# Patient Record
Sex: FEMALE | Race: WHITE
[De-identification: names, ages, dates, MRNs, and addresses within clinical notes are randomized per-mention and may not be internally consistent; named-entity substitution may affect disease eponyms.]

---

## 2017-04-23 ENCOUNTER — HOSPITAL ENCOUNTER (EMERGENCY)
Dept: HOSPITAL 62 - ER | Age: 47
Discharge: HOME | End: 2017-04-23
Payer: SELF-PAY

## 2017-04-23 VITALS — DIASTOLIC BLOOD PRESSURE: 80 MMHG | SYSTOLIC BLOOD PRESSURE: 128 MMHG

## 2017-04-23 DIAGNOSIS — M54.2: Primary | ICD-10-CM

## 2017-04-23 DIAGNOSIS — F17.200: ICD-10-CM

## 2017-04-23 DIAGNOSIS — M50.30: ICD-10-CM

## 2017-04-23 PROCEDURE — 99283 EMERGENCY DEPT VISIT LOW MDM: CPT

## 2017-04-23 PROCEDURE — 72050 X-RAY EXAM NECK SPINE 4/5VWS: CPT

## 2017-04-23 NOTE — ER DOCUMENT REPORT
ED Neck/Back Problem





- General


Chief Complaint: Neck Problem


Stated Complaint: NECK PAIN


Notes: 


45 yo female c/o pain to neck x 2-3 days.  hx/o degenerative disc disease.  no 

recent trauma, fever, headache.  


TRAVEL OUTSIDE OF THE [place] IN LAST 30 DAYS: No





- HPI


Patient complains to provider of: Pain


Onset: Yesterday


Onset: Gradual


Timing: Still present


Quality of pain: Achy, Dull


Associated symptoms: None


Exacerbated by: Movement of neck


Relieved by: Supine


Similar symptoms previously: Yes


Recently seen / treated by doctor: No





- Related Data


Allergies/Adverse Reactions: 


 





adhesive tape [Adhesive Tape] Allergy (Intermediate, Verified 17 16:07)


 


cefuroxime axetil [From Ceftin] Allergy (Mild, Verified 17 16:07)


 Nausea


clindamycin [Clindamycin] Allergy (Verified 17 16:07)


 











Past Medical History





- General


Information source: Patient





- Social History


Smoking Status: Current Every Day Smoker


Frequency of alcohol use: None


Drug Abuse: None


Lives with: Family


Family History: Reviewed & Not Pertinent


Patient has suicidal ideation: No


Patient has homicidal ideation: No





- Past Medical History


Cardiac Medical History: Reports: Hx Hypertension


   Denies: Hx Coronary Artery Disease, Hx Heart Attack


Pulmonary Medical History: Reports: Hx Asthma, Hx Bronchitis, Hx Pneumonia


   Denies: Hx COPD


Neurological Medical History: Denies: Hx Cerebrovascular Accident, Hx Seizures


Endocrine Medical History: Denies: Hx Diabetes Mellitus Type 1, Hx Diabetes 

Mellitus Type 2


Renal/ Medical History: Denies: Hx Peritoneal Dialysis


GI Medical History: Reports: Hx Gastroesophageal Reflux Disease.  Denies: Hx 

Irritable Bowel


Musculoskeltal Medical History: Reports Hx Arthritis


Psychiatric Medical History: Reports: Hx Anxiety, Hx Bipolar Disorder, Hx 

Depression


Past Surgical History: Reports: Hx  Section, Hx Orthopedic Surgery.  

Denies: Hx Hysterectomy, Hx Pacemaker





- Immunizations


Immunizations up to date: Yes


Hx Diphtheria, Pertussis, Tetanus Vaccination: Yes





Review of Systems





- Review of Systems


Constitutional: No symptoms reported


EENT: No symptoms reported


Cardiovascular: No symptoms reported


Respiratory: No symptoms reported


Gastrointestinal: No symptoms reported


Genitourinary: No symptoms reported


Female Genitourinary: No symptoms reported


Musculoskeletal: No symptoms reported


Skin: No symptoms reported


Hematologic/Lymphatic: No symptoms reported


Neurological/Psychological: No symptoms reported


-: Yes All other systems reviewed and negative





Physical Exam





- Vital signs


Vitals: 


 











Temp Pulse Resp BP Pulse Ox


 


 98.3 F   79   20   122/85   98 


 


 17 16:04  17 16:04  17 16:04  17 16:04  17 16:04











Interpretation: Normal





- General


General appearance: Appears well, Alert





- HEENT


Head: Normocephalic, Atraumatic


Eyes: Normal


Conjunctiva: Normal


Extraocular movements intact: Yes


Pupils: PERRL


Neck: Supple - + suboccipital tenderness, + certebral tenderness over C3-4 

area.  No: Meningismus





- Respiratory


Respiratory status: No respiratory distress


Chest status: Nontender


Breath sounds: Normal


Chest palpation: Normal





- Cardiovascular


Rhythm: Regular


Heart sounds: Normal auscultation


Murmur: No





- Abdominal


Inspection: Normal


Distension: No distension


Bowel sounds: Normal


Tenderness: Nontender


Organomegaly: No organomegaly





- Back


Back: Normal, Nontender





- Extremities


General upper extremity: Normal inspection, Nontender, Normal color, Normal ROM

, Normal temperature


General lower extremity: Normal inspection, Nontender, Normal color, Normal ROM

, Normal temperature, Normal weight bearing.  No: Paris's sign





- Neurological


Neuro grossly intact: Yes


Cognition: Normal


Orientation: AAOx4


[place] Coma Scale Eye Opening: Spontaneous


[place] Coma Scale Verbal: Oriented


[place] Coma Scale Motor: Obeys Commands


[place] Coma Scale Total: 15


Speech: Normal


Motor strength normal: LUE, RUE, LLE, RLE


Sensory: Normal





- Psychological


Associated symptoms: Normal affect, Normal mood





- Skin


Skin Temperature: Warm


Skin Moisture: Dry


Skin Color: Normal





Course





- Re-evaluation


Re-evalutation: 





17 17:10


cervical xray showing DDD lower cervical spine, consistant with H&P.  will DC 

with muscle relaxant and anti-inflammatory medications.  pt stable for discharge





- Vital Signs


Vital signs: 


 











Temp Pulse Resp BP Pulse Ox


 


 98.3 F   79   20   122/85   98 


 


 17 16:04  17 16:04  17 16:04  17 16:04  17 16:04














Discharge





- Discharge


Clinical Impression: 


 Neck pain





Degenerative disc disease


Qualifiers:


 Spinal region: unspecified cervical region Qualified Code(s): M50.30 - Other 

cervical disc degeneration, unspecified cervical region





Condition: Stable


Disposition: HOME, SELF-CARE


Instructions:  Muscle Relaxers ([facility]), Ibuprofen (General) ([facility]), Warm Packs ([facility]

)


Additional Instructions: 


Your xray is showing degenerative disc disease in the lower cervical spine


Take med as prescribed


Follow up with primary care/orthopedist if pain persists


Prescriptions: 


Ibuprofen [Motrin 800 Mg Tablet] 800 mg PO Q6H #20 tablet


Methocarbamol [Robaxin 500 Mg Tablet] 1,000 mg PO Q6 #30 tablet

## 2017-05-04 ENCOUNTER — HOSPITAL ENCOUNTER (OUTPATIENT)
Dept: HOSPITAL 62 - OD | Age: 47
End: 2017-05-04
Attending: ORTHOPAEDIC SURGERY
Payer: SELF-PAY

## 2017-05-04 DIAGNOSIS — Z01.810: Primary | ICD-10-CM

## 2017-05-04 DIAGNOSIS — Z01.812: ICD-10-CM

## 2017-05-04 DIAGNOSIS — Z01.818: ICD-10-CM

## 2017-05-04 LAB
ANION GAP SERPL CALC-SCNC: 14 MMOL/L (ref 5–19)
BASOPHILS # BLD AUTO: 0.1 10^3/UL (ref 0–0.2)
BASOPHILS NFR BLD AUTO: 0.6 % (ref 0–2)
BUN SERPL-MCNC: 14 MG/DL (ref 7–20)
CALCIUM: 9.5 MG/DL (ref 8.4–10.2)
CHLORIDE SERPL-SCNC: 103 MMOL/L (ref 98–107)
CO2 SERPL-SCNC: 23 MMOL/L (ref 22–30)
CREAT SERPL-MCNC: 0.73 MG/DL (ref 0.52–1.25)
EOSINOPHIL # BLD AUTO: 0.1 10^3/UL (ref 0–0.6)
EOSINOPHIL NFR BLD AUTO: 1.2 % (ref 0–6)
ERYTHROCYTE [DISTWIDTH] IN BLOOD BY AUTOMATED COUNT: 13.8 % (ref 11.5–14)
GLUCOSE SERPL-MCNC: 134 MG/DL (ref 75–110)
HCT VFR BLD CALC: 40.3 % (ref 36–47)
HGB BLD-MCNC: 13.4 G/DL (ref 12–15.5)
HGB HCT DIFFERENCE: -0.1
LYMPHOCYTES # BLD AUTO: 2.3 10^3/UL (ref 0.5–4.7)
LYMPHOCYTES NFR BLD AUTO: 23.5 % (ref 13–45)
MCH RBC QN AUTO: 29.4 PG (ref 27–33.4)
MCHC RBC AUTO-ENTMCNC: 33.2 G/DL (ref 32–36)
MCV RBC AUTO: 89 FL (ref 80–97)
MONOCYTES # BLD AUTO: 0.5 10^3/UL (ref 0.1–1.4)
MONOCYTES NFR BLD AUTO: 5.2 % (ref 3–13)
NEUTROPHILS # BLD AUTO: 6.7 10^3/UL (ref 1.7–8.2)
NEUTS SEG NFR BLD AUTO: 69.5 % (ref 42–78)
POTASSIUM SERPL-SCNC: 3.9 MMOL/L (ref 3.6–5)
RBC # BLD AUTO: 4.55 10^6/UL (ref 3.72–5.28)
SODIUM SERPL-SCNC: 140 MMOL/L (ref 137–145)
WBC # BLD AUTO: 9.6 10^3/UL (ref 4–10.5)

## 2017-05-04 PROCEDURE — 36415 COLL VENOUS BLD VENIPUNCTURE: CPT

## 2017-05-04 PROCEDURE — 71020: CPT

## 2017-05-04 PROCEDURE — 93010 ELECTROCARDIOGRAM REPORT: CPT

## 2017-05-04 PROCEDURE — 93005 ELECTROCARDIOGRAM TRACING: CPT

## 2017-05-04 PROCEDURE — 80048 BASIC METABOLIC PNL TOTAL CA: CPT

## 2017-05-04 PROCEDURE — 85025 COMPLETE CBC W/AUTO DIFF WBC: CPT

## 2017-06-13 ENCOUNTER — HOSPITAL ENCOUNTER (EMERGENCY)
Dept: HOSPITAL 62 - ER | Age: 47
Discharge: HOME | End: 2017-06-13
Payer: SELF-PAY

## 2017-06-13 VITALS — DIASTOLIC BLOOD PRESSURE: 97 MMHG | SYSTOLIC BLOOD PRESSURE: 117 MMHG

## 2017-06-13 DIAGNOSIS — S89.92XA: ICD-10-CM
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
## 2017-06-13 DIAGNOSIS — G89.29: ICD-10-CM

## 2017-06-13 DIAGNOSIS — I10: ICD-10-CM

## 2017-06-13 DIAGNOSIS — J45.909: ICD-10-CM

## 2017-06-13 DIAGNOSIS — M54.9: ICD-10-CM

## 2017-06-13 DIAGNOSIS — F17.200: ICD-10-CM

## 2017-06-13 DIAGNOSIS — S06.0X0A: Primary | ICD-10-CM

## 2017-06-13 LAB
ALBUMIN SERPL-MCNC: 4.1 G/DL (ref 3.5–5)
ALP SERPL-CCNC: 96 U/L (ref 38–126)
ALT SERPL-CCNC: 27 U/L (ref 9–52)
ANION GAP SERPL CALC-SCNC: 8 MMOL/L (ref 5–19)
APPEARANCE UR: CLEAR
APTT BLD: 30.1 SEC (ref 23.5–35.8)
AST SERPL-CCNC: 16 U/L (ref 14–36)
BARBITURATES UR QL SCN: NEGATIVE
BASOPHILS # BLD AUTO: 0.1 10^3/UL (ref 0–0.2)
BASOPHILS NFR BLD AUTO: 0.9 % (ref 0–2)
BILIRUB DIRECT SERPL-MCNC: 0.3 MG/DL (ref 0–0.4)
BILIRUB SERPL-MCNC: 0.5 MG/DL (ref 0.2–1.3)
BILIRUB UR QL STRIP: NEGATIVE
BUN SERPL-MCNC: 15 MG/DL (ref 7–20)
CALCIUM: 9.1 MG/DL (ref 8.4–10.2)
CHLORIDE SERPL-SCNC: 106 MMOL/L (ref 98–107)
CK SERPL-CCNC: 43 U/L (ref 30–135)
CO2 SERPL-SCNC: 27 MMOL/L (ref 22–30)
CREAT SERPL-MCNC: 0.76 MG/DL (ref 0.52–1.25)
EOSINOPHIL # BLD AUTO: 0.1 10^3/UL (ref 0–0.6)
EOSINOPHIL NFR BLD AUTO: 1.1 % (ref 0–6)
ERYTHROCYTE [DISTWIDTH] IN BLOOD BY AUTOMATED COUNT: 13.6 % (ref 11.5–14)
ETHANOL SERPL-MCNC: < 10 MG/DL
GLUCOSE SERPL-MCNC: 115 MG/DL (ref 75–110)
GLUCOSE UR STRIP-MCNC: NEGATIVE MG/DL
HCT VFR BLD CALC: 40.2 % (ref 36–47)
HGB BLD-MCNC: 13.3 G/DL (ref 12–15.5)
HGB HCT DIFFERENCE: -0.3
KETONES UR STRIP-MCNC: (no result) MG/DL
LYMPHOCYTES # BLD AUTO: 2.2 10^3/UL (ref 0.5–4.7)
LYMPHOCYTES NFR BLD AUTO: 25.6 % (ref 13–45)
MCH RBC QN AUTO: 29.8 PG (ref 27–33.4)
MCHC RBC AUTO-ENTMCNC: 33.1 G/DL (ref 32–36)
MCV RBC AUTO: 90 FL (ref 80–97)
METHADONE UR QL SCN: NEGATIVE
MONOCYTES # BLD AUTO: 0.5 10^3/UL (ref 0.1–1.4)
MONOCYTES NFR BLD AUTO: 5.8 % (ref 3–13)
NEUTROPHILS # BLD AUTO: 5.6 10^3/UL (ref 1.7–8.2)
NEUTS SEG NFR BLD AUTO: 66.6 % (ref 42–78)
NITRITE UR QL STRIP: POSITIVE
PCP UR QL SCN: NEGATIVE
PH UR STRIP: 5 [PH] (ref 5–9)
POTASSIUM SERPL-SCNC: 3.8 MMOL/L (ref 3.6–5)
PROT SERPL-MCNC: 7.4 G/DL (ref 6.3–8.2)
PROT UR STRIP-MCNC: NEGATIVE MG/DL
PROTHROMBIN TIME: 13 SEC (ref 11.4–15.4)
RBC # BLD AUTO: 4.47 10^6/UL (ref 3.72–5.28)
SODIUM SERPL-SCNC: 141.4 MMOL/L (ref 137–145)
SP GR UR STRIP: 1.02
URINE OPIATES LOW: NEGATIVE
UROBILINOGEN UR-MCNC: NEGATIVE MG/DL (ref ?–2)
WBC # BLD AUTO: 8.5 10^3/UL (ref 4–10.5)

## 2017-06-13 PROCEDURE — 80053 COMPREHEN METABOLIC PANEL: CPT

## 2017-06-13 PROCEDURE — 73562 X-RAY EXAM OF KNEE 3: CPT

## 2017-06-13 PROCEDURE — 85730 THROMBOPLASTIN TIME PARTIAL: CPT

## 2017-06-13 PROCEDURE — 96374 THER/PROPH/DIAG INJ IV PUSH: CPT

## 2017-06-13 PROCEDURE — 51701 INSERT BLADDER CATHETER: CPT

## 2017-06-13 PROCEDURE — 82550 ASSAY OF CK (CPK): CPT

## 2017-06-13 PROCEDURE — 80307 DRUG TEST PRSMV CHEM ANLYZR: CPT

## 2017-06-13 PROCEDURE — 99284 EMERGENCY DEPT VISIT MOD MDM: CPT

## 2017-06-13 PROCEDURE — 93010 ELECTROCARDIOGRAM REPORT: CPT

## 2017-06-13 PROCEDURE — 85610 PROTHROMBIN TIME: CPT

## 2017-06-13 PROCEDURE — 84484 ASSAY OF TROPONIN QUANT: CPT

## 2017-06-13 PROCEDURE — 93005 ELECTROCARDIOGRAM TRACING: CPT

## 2017-06-13 PROCEDURE — 36415 COLL VENOUS BLD VENIPUNCTURE: CPT

## 2017-06-13 PROCEDURE — 85025 COMPLETE CBC W/AUTO DIFF WBC: CPT

## 2017-06-13 PROCEDURE — 70450 CT HEAD/BRAIN W/O DYE: CPT

## 2017-06-13 PROCEDURE — 81001 URINALYSIS AUTO W/SCOPE: CPT

## 2017-06-13 NOTE — ER DOCUMENT REPORT
ED Fall





- General


Chief Complaint: Fall


Stated Complaint: FALL/HEAD INJURY,KNEE INJURY


Time Seen by Provider: 17 14:30


Mode of Arrival: Wheelchair


Information source: Relative


Notes: 


The patient is a 46-year-old female, past medical history chronic left knee 

pain with multiple surgeries, chronic back pain, presents after she tripped 

this morning, fell and hit the right back of her head and landed on her left 

knee.  She had left knee surgery by Dr. Valverde a few months ago to remove 

hardware.  She was fine for a few hours and then started to have vomiting and 

decreased mental status.  Patient is somnolent, but will awaken to his voice.  

History obtained from daughter at bedside.


TRAVEL OUTSIDE OF THE [place] IN LAST 30 DAYS: No





- Related data


Allergies/Adverse Reactions: 


 





adhesive tape [Adhesive Tape] Allergy (Intermediate, Verified 17 15:36)


 


cefuroxime axetil [From Ceftin] Allergy (Mild, Verified 17 15:36)


 Nausea


clindamycin [Clindamycin] Allergy (Verified 17 15:36)


 











Past Medical History





- General


Information source: Patient, Relative





- Social History


Smoking Status: Current Every Day Smoker


Chew tobacco use (# tins/day): No


Drug Abuse: Marijuana


Family History: Reviewed & Not Pertinent


Patient has suicidal ideation: No


Patient has homicidal ideation: No





- Past Medical History


Cardiac Medical History: Reports: Hx Hypertension


   Denies: Hx Coronary Artery Disease, Hx Heart Attack


Pulmonary Medical History: Reports: Hx Asthma, Hx Bronchitis, Hx Pneumonia


   Denies: Hx COPD


Neurological Medical History: Denies: Hx Cerebrovascular Accident, Hx Seizures


Endocrine Medical History: Denies: Hx Diabetes Mellitus Type 1, Hx Diabetes 

Mellitus Type 2


Renal/ Medical History: Denies: Hx Peritoneal Dialysis


GI Medical History: Reports: Hx Gastroesophageal Reflux Disease.  Denies: Hx 

Irritable Bowel


Musculoskeltal Medical History: Reports Hx Arthritis


Psychiatric Medical History: Reports: Hx Anxiety, Hx Bipolar Disorder, Hx 

Depression


Past Surgical History: Reports: Hx  Section, Hx Orthopedic Surgery.  

Denies: Hx Hysterectomy, Hx Pacemaker





- Immunizations


Immunizations up to date: Yes


Hx Diphtheria, Pertussis, Tetanus Vaccination: Yes





Review of Systems





- Review of Systems


Notes: 


REVIEW OF SYSTEMS:


CONSTITUTIONAL: -fevers, -chills


EENT: -eye pain, -difficulty swallowing, -nasal congestion


CARDIOVASCULAR:-chest pain, -syncope.


RESPIRATORY: -cough, -SOB


GASTROINTESTINAL:  -abdominal pain, -nausea, +vomiting, -diarrhea


GENITOURINARY: -dysuria, -hematuria


MUSCULOSKELETAL: -back pain, -neck pain


SKIN: -rash or skin lesions.


HEMATOLOGIC: -easy bruising or bleeding.


LYMPHATIC: -swollen, enlarged glands.


NEUROLOGICAL: +decreased mental status, +headache, -neurologic symptoms


PSYCHIATRIC: -anxiety, -depression.


ALL OTHER SYSTEMS REVIEWED AND NEGATIVE.





Physical Exam





- Vital signs


Vitals: 


 











Pulse Resp BP Pulse Ox


 


 65   22 H  149/83 H  93 


 


 17 14:15  17 14:15  17 14:15  17 14:15














- Notes


Notes: 


PHYSICAL EXAMINATION:





GENERAL: Somnolent, no acute distress





HEAD: Atraumatic, normocephalic.





EYES: Pupils equal round and reactive to light, extraocular movements intact, 

sclera anicteric, conjunctiva are normal.





ENT: nares patent, oropharynx clear without exudates.  Moist mucous membranes.





NECK: Normal range of motion, supple without lymphadenopathy





LUNGS: Breath sounds clear to auscultation bilaterally and equal.  No wheezes 

rales or rhonchi.





HEART: Regular rate and rhythm without murmurs





ABDOMEN: Soft, nontender, normoactive bowel sounds.  No guarding, no rebound.  

No masses appreciated.





EXTREMITIES: Mild swelling of left knee, normal range of motion, no pitting or 

edema.  No cyanosis.





NEUROLOGICAL: Cranial nerves grossly intact.  Somnolent.





PSYCH: Normal mood, normal affect.





SKIN: Warm, Dry, normal turgor, no rashes or lesions noted.





Course





- Re-evaluation


Re-evalutation: 


Patient's head CT and knee x-ray do not show any bleeds or fractures.  On 

arrival to the ER, the patient has pinpoint pupils, is somnolent and breathing 

6 times a minute.  After Narcan, patient is wide awake and in no respiratory 

distress.  UDS is positive for THC and Benzos. Suspect narcotic overdose also 

with a narcotic that does not show up on UDS.  Instructed patient about 

concussion instructions and following up with her primary care physician and 

orthopedist.





- Vital Signs


Vital signs: 


 











Temp Pulse Resp BP Pulse Ox


 


    65   19   141/90 H  100 


 


    17 14:15  17 15:19  17 15:18  17 15:29














- Laboratory


Result Diagrams: 


 17 15:15





 17 15:15


Laboratory results interpreted by me: 


 











  17





  15:15 15:30


 


Glucose  115 H 


 


Urine Ketones   TRACE H


 


Urine Nitrite   POSITIVE H


 


Salicylates  < 1.0 L 


 


Acetaminophen  < 10 L 














- Diagnostic Test


Radiology reviewed: Image reviewed, Reports reviewed


Radiology results interpreted by me: 


CT Head: NAD


Left knee x-ray: NAD





Discharge





- Discharge


Clinical Impression: 


Head injury, closed, with concussion


Qualifiers:


 Encounter type: initial encounter Loss of consciousness presence/duration: 

without LOC Qualified Code(s): S06.0X0A - Concussion without loss of 

consciousness, initial encounter





Condition: Stable


Disposition: HOME, SELF-CARE


Additional Instructions: 


Head Injury





     Your examination shows no evidence of brain injury.  You can therefore be 

safely observed at home.


     Give clear liquids only for the first eight hours.  Acetaminophen or 

ibuprofen can safely be given for pain.  Follow the directions on the bottle.  

Do not give any medication that may alter her/his level of alertness.  Limit 

activity for the first 24 hours -- bed rest is advisable at first.


    Several times during the first 24 hours, check the patient to see if the 

pupils are equal in size to each other, that the patient is easily arousable, 

and responds normally.  Contact your doctor or go to the hospital if any of the 

following things occur: Persistent or projectile vomiting, a seizure, confusion

, unequal pupil size, difficulty in arousing the patient, worsening or 

continued headache, or failure to improve as expected.





Contusion





     Your injury has resulted in a contusion -- a crushing of the deep tissues.

  No injury to important structures was detected during the physician's exam.  

Contusions vary in the amount of pain they cause, and in the length of time 

required for healing.  Typically, the area will become bruised, and will remain 

painful to touch for two or three weeks.  However, most patients are back to 

working and playing within a few days.


     After the initial period of rest and cold-packs, your symptoms (together 

with the doctor's recommendations) will determine how rapidly you can get back 

to full activity.  Usually this means "do what feels okay, but don't do things 

that hurt."


     If re-examination was recommended, it's important to follow up as 

instructed.  Call the doctor or return any time if pain increases, if swelling 

becomes severe, if you develop numbness or weakness in an injured extremity, or 

if any other alarming symptoms occur.

## 2017-06-13 NOTE — RADIOLOGY REPORT (SQ)
EXAM DESCRIPTION:  CT HEAD WITHOUT



COMPLETED DATE/TIME:  6/13/2017 2:52 pm



REASON FOR STUDY:  hit head, AMS



COMPARISON:  None.



TECHNIQUE:  Axial images acquired through the brain without intravenous contrast.  Images reviewed wi
th bone, brain and subdural windows.  Images stored on PACS.

All CT scanners at this facility use dose modulation, iterative reconstruction, and/or weight based d
osing when appropriate to reduce radiation dose to as low as reasonably achievable (ALARA).

[facility]: Dose Right  [facility]: CareDose    [facility]: Dose Right    [facility]: Teradose 4D    [facility]: Smart Technologies



RADIATION DOSE:  64.61 mGy.



LIMITATIONS:  None.



FINDINGS:  VENTRICLES: Normal size and contour.

CEREBRUM: No masses.  No hemorrhage.  No midline shift.  Normal gray/white matter differentiation.  N
o evidence for acute infarction.

CEREBELLUM: No masses.  No hemorrhage.  No alteration of density.  No evidence for acute infarction.

EXTRAAXIAL SPACES: No fluid collections.  No masses.

ORBITS AND GLOBE: No intra- or extraconal masses.  Normal contour of globe without masses.

CALVARIUM: No fracture.

PARANASAL SINUSES: No fluid or mucosal thickening.

SOFT TISSUES: No mass or hematoma.

OTHER: No other significant finding.



IMPRESSION:  NORMAL BRAIN CT WITHOUT CONTRAST.



TECHNICAL DOCUMENTATION:  JOB ID:  [id number]

Quality ID # [id number]: Final reports with documentation of one or more dose reduction techniques (e.g., Au
tomated exposure control, adjustment of the mA and/or kV according to patient size, use of iterative 
reconstruction technique)

 2011 [employer]- All Rights Reserved

## 2017-06-13 NOTE — EKG REPORT
SEVERITY:- ABNORMAL ECG -

SINUS RHYTHM

FIRST DEGREE AV BLOCK

:

Confirmed by: Venu Arellano 13-Jun-2017 22:46:16

## 2017-06-13 NOTE — RADIOLOGY REPORT (SQ)
EXAM DESCRIPTION:  KNEE LEFT 3 VIEWS



COMPLETED DATE/TIME:  6/13/2017 3:45 pm



REASON FOR STUDY:  left knee pain



COMPARISON:  3/29/2016.



NUMBER OF VIEWS:  Three views.



TECHNIQUE:  AP, lateral, and sunrise patella radiographic images acquired of the left knee.



LIMITATIONS:  None.



FINDINGS:  MINERALIZATION: Normal.

BONES: No acute fracture or dislocation.  Deformity of the patella secondary to prior surgery.  No wo
rrisome bone lesions.

JOINT: No effusion.

SOFT TISSUES: Prepatellar soft tissue swelling.  Several small radiopaque densities in the anterior s
oft tissues seen on the lateral view.

OTHER: No other significant finding.



IMPRESSION:  DEFORMITY OF THE PATELLA SECONDARY TO PRIOR SURGERY.  PREPATELLAR SOFT TISSUE SWELLING W
ITH SEVERAL SMALL RADIOPAQUE DENSITIES SEEN ON THE LATERAL VIEW.  THESE MAY BE RELATED TO PRIOR HARDW
ARE.  NO ACUTE BONY FINDINGS.



TECHNICAL DOCUMENTATION:  JOB ID:  [id number]

 2011 [employer]- All Rights Reserved

## 2017-06-13 NOTE — ER DOCUMENT REPORT
ED Medical Screen (RME)





- General


Chief Complaint: Fall


Stated Complaint: FALL/HEAD INJURY,KNEE INJURY


Time Seen by Provider: 17 14:30


Mode of Arrival: Wheelchair


Information source: Relative


Notes: 


This is a 46-year-old female who presents for altered mental status.  Her 

family member states that this morning she had a fall and she did hit her head 

on the table in the floor.  No loss of consciousness and initially she was 

feeling fine.  She has had a recent left knee surgery and she also injured her 

knee on the fall.  However about 1 hour ago the patient began vomiting and 

exhibited altered mental status.  Patient's family states she is having 

difficulty staying awake and answer questions.





Family and the patient denied any medication or substance intake today.





I have greeted and performed a rapid initial assessment of this patient.  A 

comprehensive ED assessment and evaluation of the patient, analysis of test 

results and completion of the medical decision making process will be conducted 

by additional ED providers.


TRAVEL OUTSIDE OF THE [place] IN LAST 30 DAYS: No





- Related Data


Allergies/Adverse Reactions: 


 





adhesive tape [Adhesive Tape] Allergy (Intermediate, Verified 17 14:14)


 


cefuroxime axetil [From Ceftin] Allergy (Mild, Verified 17 14:14)


 Nausea


clindamycin [Clindamycin] Allergy (Verified 17 14:14)


 











Past Medical History





- Social History


Chew tobacco use (# tins/day): No


Drug Abuse: Marijuana





- Past Medical History


Cardiac Medical History: Reports: Hx Hypertension


   Denies: Hx Coronary Artery Disease, Hx Heart Attack


Pulmonary Medical History: Reports: Hx Asthma, Hx Bronchitis, Hx Pneumonia


   Denies: Hx COPD


Neurological Medical History: Denies: Hx Cerebrovascular Accident, Hx Seizures


Endocrine Medical History: Denies: Hx Diabetes Mellitus Type 1, Hx Diabetes 

Mellitus Type 2


Renal/ Medical History: Denies: Hx Peritoneal Dialysis


GI Medical History: Reports: Hx Gastroesophageal Reflux Disease.  Denies: Hx 

Irritable Bowel


Musculoskeltal Medical History: Reports Hx Arthritis


Psychiatric Medical History: Reports: Hx Anxiety, Hx Bipolar Disorder, Hx 

Depression


Past Surgical History: Reports: Hx  Section, Hx Orthopedic Surgery.  

Denies: Hx Hysterectomy, Hx Pacemaker





- Immunizations


Immunizations up to date: Yes


Hx Diphtheria, Pertussis, Tetanus Vaccination: Yes





Physical Exam





- Vital signs


Vitals: 


 











Pulse Resp BP Pulse Ox


 


 65   22 H  149/83 H  93 


 


 17 14:15  17 14:15  17 14:15  17 14:15














- Notes


Notes: 


Patient appears drowsy and will answer questions very slowly.  No obvious focal 

neuro deficits however exam is limited by patient cooperation.











Patient will be taken straight to head CT and then to a room on main side.





Course





- Vital Signs


Vital signs: 


 











Temp Pulse Resp BP Pulse Ox


 


    65   22 H  149/83 H  93 


 


    17 14:15  17 14:15  17 14:15  17 14:15

## 2017-10-20 ENCOUNTER — HOSPITAL ENCOUNTER (EMERGENCY)
Dept: HOSPITAL 62 - ER | Age: 47
Discharge: LEFT BEFORE DISCHARGE | End: 2017-10-20
Payer: SELF-PAY

## 2017-10-20 VITALS — SYSTOLIC BLOOD PRESSURE: 138 MMHG | DIASTOLIC BLOOD PRESSURE: 87 MMHG

## 2017-10-20 DIAGNOSIS — Z53.21: Primary | ICD-10-CM

## 2019-02-06 ENCOUNTER — HOSPITAL ENCOUNTER (OUTPATIENT)
Dept: HOSPITAL 62 - WI | Age: 49
End: 2019-02-06
Attending: FAMILY MEDICINE
Payer: COMMERCIAL

## 2019-02-06 DIAGNOSIS — N64.52: Primary | ICD-10-CM

## 2019-02-06 PROCEDURE — 76642 ULTRASOUND BREAST LIMITED: CPT

## 2019-02-06 PROCEDURE — 77066 DX MAMMO INCL CAD BI: CPT

## 2019-02-06 NOTE — WOMENS IMAGING REPORT
EXAM DESCRIPTION:  BILAT DIAGNOSTIC MAMMO W/CAD; U/S BREAST UNILAT LIMITED



COMPLETED DATE/TIME:  2/6/2019 11:06 am; 2/6/2019 12:37 pm



REASON FOR STUDY:  N64.52 NIPPLE DISCHARGE; N64.52 NIPPLE DISCHARGE LEFT; N64.52 NIPPLE DISCHARGE RIG
HT N64.52  NIPPLE DISCHARGE



COMPARISON:  NO PREVIOUS, BASELINE STUDY



TECHNIQUE:  Standard craniocaudal and mediolateral oblique views of each breast recorded using digita
l acquisition.

Additional bilateral 90 mediolateral view mammograms were obtained.  Left breast cone compression re
troareolar region in the CC and MLO orientations

Bilateral breast ultrasound was performed, for bilateral nipple discharge and palpable abnormality le
ft periareolar region



LIMITATIONS:  None.



FINDINGS:  RIGHT BREAST

MASSES: No suspicious masses.

CALCIFICATIONS: No new or suspicious calcifications.

ARCHITECTURAL DISTORTION: None.

DEVELOPING DENSITY: None.

ASYMMETRY: None noted.

OTHER: No other significant findings.

LEFT BREAST

MASSES: No suspicious masses.

CALCIFICATIONS: No new or suspicious calcifications.

ARCHITECTURAL DISTORTION: None.

DEVELOPING DENSITY: None.

ASYMMETRY: None noted.

OTHER: No other significant finding.

Read with the assistance of CAD:

[facility] - R2 [employer] Version 1.3

.[facility] - R2 [employer] Version 2.1

.[facility] - R2 C[employer] Version 2.4

.[facility] - R2 [employer] Version 2.4

.[facility] - R2  Version 9.2

Bilateral breast ultrasound:

Ultrasound of the right retroareolar region was performed to evaluate for nipple discharge.  No dilat
ed ducts.  No cysts.  No masses.  No worrisome acoustic absorption.  No findings to explain history o
f right nipple discharge by ultrasound.

Ultrasound of the left retro areolar region was performed to evaluate for nipple discharge and palpab
le abnormality.  Along the left retroareolar 3 o'clock position, a 5 x 3 mm simple cyst is present.  
No dilated ducts.  No solid nodules.  No worrisome acoustic absorption.  No findings to explain histo
ry of left nipple discharge by ultrasound.



IMPRESSION:  No mammographic or sonographic evidence for malignancy bilaterally



BREAST DENSITY:  b. There are scattered areas of fibroglandular density.



BIRAD:  2 Benign findings.



RECOMMENDATION:  RECOMMENDED FOLLOW UP: Please continue yearly bilateral screening mammography

SPECIFIC INTERVENTION/IMAGING/CONSULTATION RECOMMENDED:No additional intervention/ imaging/consultati
on needed at this time.

COMMUNICATION:The negative/benign results were communicated to the patient.



COMMENT:  The patient has been notified of the results by letter per SA requirements. Additional no
tification policies are in place for contacting patient with suspicious or incomplete findings.

Quality ID #[id number]: The American College of Radiology recommends an annual screening mammogram for women
 aged 40 years or over. This facility utilizes a reminder system to ensure that all patients receive 
reminder letters, and/or direct phone calls for appointments. This includes reminders for routine scr
eening mammograms, diagnostic mammograms, or other Breast Imaging Interventions when appropriate.  Th
is patient will be placed in the appropriate reminder system.

The American College of Radiology (ACR) has developed recommendations for screening MRI of the breast
s in certain patient populations, to be used in conjunction with mammography.  Breast MRI surveillanc
e may be appropriate for women with more than 20% lifetime risk of developing breast cancer  as deter
mined by genetic testing, significant family history of the disease, or history of mantle radiation f
or Hodgkins Disease.  ACR Practice Guidelines 2008.



TECHNICAL DOCUMENTATION:  FINDING NUMBER: (1)

ASSESSMENT: (1)

JOB ID:  [id number]

 2011 [employer]- All Rights Reserved



Reading location - IP/workstation name: [email or web address]

## 2019-02-06 NOTE — WOMENS IMAGING REPORT
EXAM DESCRIPTION:  BILAT DIAGNOSTIC MAMMO W/CAD; U/S BREAST UNILAT LIMITED



COMPLETED DATE/TIME:  2/6/2019 11:06 am; 2/6/2019 12:37 pm



REASON FOR STUDY:  N64.52 NIPPLE DISCHARGE; N64.52 NIPPLE DISCHARGE LEFT; N64.52 NIPPLE DISCHARGE RIG
HT N64.52  NIPPLE DISCHARGE



COMPARISON:  NO PREVIOUS, BASELINE STUDY



TECHNIQUE:  Standard craniocaudal and mediolateral oblique views of each breast recorded using digita
l acquisition.

Additional bilateral 90 mediolateral view mammograms were obtained.  Left breast cone compression re
troareolar region in the CC and MLO orientations

Bilateral breast ultrasound was performed, for bilateral nipple discharge and palpable abnormality le
ft periareolar region



LIMITATIONS:  None.



FINDINGS:  RIGHT BREAST

MASSES: No suspicious masses.

CALCIFICATIONS: No new or suspicious calcifications.

ARCHITECTURAL DISTORTION: None.

DEVELOPING DENSITY: None.

ASYMMETRY: None noted.

OTHER: No other significant findings.

LEFT BREAST

MASSES: No suspicious masses.

CALCIFICATIONS: No new or suspicious calcifications.

ARCHITECTURAL DISTORTION: None.

DEVELOPING DENSITY: None.

ASYMMETRY: None noted.

OTHER: No other significant finding.

Read with the assistance of CAD:

.[facility] - R2 Cenova Version 1.3

.[facility] - R2 Cenova Version 2.1

.[facility] - R2 Cenova Version 2.4

.[facility] - R2 Cenova Version 2.4

.[facility] - R2  Version 9.2

Bilateral breast ultrasound:

Ultrasound of the right retroareolar region was performed to evaluate for nipple discharge.  No dilat
ed ducts.  No cysts.  No masses.  No worrisome acoustic absorption.  No findings to explain history o
f right nipple discharge by ultrasound.

Ultrasound of the left retro areolar region was performed to evaluate for nipple discharge and palpab
le abnormality.  Along the left retroareolar 3 o'clock position, a 5 x 3 mm simple cyst is present.  
No dilated ducts.  No solid nodules.  No worrisome acoustic absorption.  No findings to explain histo
ry of left nipple discharge by ultrasound.



IMPRESSION:  No mammographic or sonographic evidence for malignancy bilaterally



BREAST DENSITY:  b. There are scattered areas of fibroglandular density.



BIRAD:  2 Benign findings.



RECOMMENDATION:  RECOMMENDED FOLLOW UP: Please continue yearly bilateral screening mammography

SPECIFIC INTERVENTION/IMAGING/CONSULTATION RECOMMENDED:No additional intervention/ imaging/consultati
on needed at this time.

COMMUNICATION:The negative/benign results were communicated to the patient.



COMMENT:  The patient has been notified of the results by letter per SA requirements. Additional no
tification policies are in place for contacting patient with suspicious or incomplete findings.

Quality ID #225: The American College of Radiology recommends an annual screening mammogram for women
 aged 40 years or over. This facility utilizes a reminder system to ensure that all patients receive 
reminder letters, and/or direct phone calls for appointments. This includes reminders for routine scr
eening mammograms, diagnostic mammograms, or other Breast Imaging Interventions when appropriate.  Th
is patient will be placed in the appropriate reminder system.

The American College of Radiology (ACR) has developed recommendations for screening MRI of the breast
s in certain patient populations, to be used in conjunction with mammography.  Breast MRI surveillanc
e may be appropriate for women with more than 20% lifetime risk of developing breast cancer  as deter
mined by genetic testing, significant family history of the disease, or history of mantle radiation f
or Hodgkins Disease.  ACR Practice Guidelines 2008.



TECHNICAL DOCUMENTATION:  FINDING NUMBER: (1)

ASSESSMENT: (1)

JOB ID:  [id number]

 2011 [employer]- All Rights Reserved



Reading location - IP/workstation name: [email or web address]

## 2019-02-06 NOTE — WOMENS IMAGING REPORT
EXAM DESCRIPTION:  BILAT DIAGNOSTIC MAMMO W/CAD; U/S BREAST UNILAT LIMITED



COMPLETED DATE/TIME:  2/6/2019 11:06 am; 2/6/2019 12:37 pm



REASON FOR STUDY:  N64.52 NIPPLE DISCHARGE; N64.52 NIPPLE DISCHARGE LEFT; N64.52 NIPPLE DISCHARGE RIG
HT N64.52  NIPPLE DISCHARGE



COMPARISON:  NO PREVIOUS, BASELINE STUDY



TECHNIQUE:  Standard craniocaudal and mediolateral oblique views of each breast recorded using digita
l acquisition.

Additional bilateral 90 mediolateral view mammograms were obtained.  Left breast cone compression re
troareolar region in the CC and MLO orientations

Bilateral breast ultrasound was performed, for bilateral nipple discharge and palpable abnormality le
ft periareolar region



LIMITATIONS:  None.



FINDINGS:  RIGHT BREAST

MASSES: No suspicious masses.

CALCIFICATIONS: No new or suspicious calcifications.

ARCHITECTURAL DISTORTION: None.

DEVELOPING DENSITY: None.

ASYMMETRY: None noted.

OTHER: No other significant findings.

LEFT BREAST

MASSES: No suspicious masses.

CALCIFICATIONS: No new or suspicious calcifications.

ARCHITECTURAL DISTORTION: None.

DEVELOPING DENSITY: None.

ASYMMETRY: None noted.

OTHER: No other significant finding.

Read with the assistance of CAD:

.[facility] - R2 Cenova Version 1.3

.[facility] - R2 Cenova Version 2.1

.[facility] - R2 Cenova Version 2.4

.[facility] [place] [facility] - R2 Cenova Version 2.4

.[facility] - R2  Version 9.2

Bilateral breast ultrasound:

Ultrasound of the right retroareolar region was performed to evaluate for nipple discharge.  No dilat
ed ducts.  No cysts.  No masses.  No worrisome acoustic absorption.  No findings to explain history o
f right nipple discharge by ultrasound.

Ultrasound of the left retro areolar region was performed to evaluate for nipple discharge and palpab
le abnormality.  Along the left retroareolar 3 o'clock position, a 5 x 3 mm simple cyst is present.  
No dilated ducts.  No solid nodules.  No worrisome acoustic absorption.  No findings to explain histo
ry of left nipple discharge by ultrasound.



IMPRESSION:  No mammographic or sonographic evidence for malignancy bilaterally



BREAST DENSITY:  b. There are scattered areas of fibroglandular density.



BIRAD:  2 Benign findings.



RECOMMENDATION:  RECOMMENDED FOLLOW UP: Please continue yearly bilateral screening mammography

SPECIFIC INTERVENTION/IMAGING/CONSULTATION RECOMMENDED:No additional intervention/ imaging/consultati
on needed at this time.

COMMUNICATION:The negative/benign results were communicated to the patient.



COMMENT:  The patient has been notified of the results by letter per SA requirements. Additional no
tification policies are in place for contacting patient with suspicious or incomplete findings.

Quality ID #225: The American College of Radiology recommends an annual screening mammogram for women
 aged 40 years or over. This facility utilizes a reminder system to ensure that all patients receive 
reminder letters, and/or direct phone calls for appointments. This includes reminders for routine scr
eening mammograms, diagnostic mammograms, or other Breast Imaging Interventions when appropriate.  Th
is patient will be placed in the appropriate reminder system.

The American College of Radiology (ACR) has developed recommendations for screening MRI of the breast
s in certain patient populations, to be used in conjunction with mammography.  Breast MRI surveillanc
e may be appropriate for women with more than 20% lifetime risk of developing breast cancer  as deter
mined by genetic testing, significant family history of the disease, or history of mantle radiation f
or Hodgkins Disease.  ACR Practice Guidelines 2008.



TECHNICAL DOCUMENTATION:  FINDING NUMBER: (1)

ASSESSMENT: (1)

JOB ID:  [id number]

 2011 [email or web address]- All Rights Reserved



Reading location - IP/workstation name: [email or web address]